# Patient Record
Sex: FEMALE | Race: OTHER | HISPANIC OR LATINO
[De-identification: names, ages, dates, MRNs, and addresses within clinical notes are randomized per-mention and may not be internally consistent; named-entity substitution may affect disease eponyms.]

---

## 2021-12-22 ENCOUNTER — NON-APPOINTMENT (OUTPATIENT)
Age: 41
End: 2021-12-22

## 2021-12-22 ENCOUNTER — APPOINTMENT (OUTPATIENT)
Dept: OBGYN | Facility: CLINIC | Age: 41
End: 2021-12-22
Payer: COMMERCIAL

## 2021-12-22 VITALS
SYSTOLIC BLOOD PRESSURE: 120 MMHG | WEIGHT: 237 LBS | DIASTOLIC BLOOD PRESSURE: 82 MMHG | HEIGHT: 65 IN | BODY MASS INDEX: 39.49 KG/M2

## 2021-12-22 DIAGNOSIS — N93.9 ABNORMAL UTERINE AND VAGINAL BLEEDING, UNSPECIFIED: ICD-10-CM

## 2021-12-22 DIAGNOSIS — Z72.3 LACK OF PHYSICAL EXERCISE: ICD-10-CM

## 2021-12-22 DIAGNOSIS — Z86.39 PERSONAL HISTORY OF OTHER ENDOCRINE, NUTRITIONAL AND METABOLIC DISEASE: ICD-10-CM

## 2021-12-22 DIAGNOSIS — Z86.018 PERSONAL HISTORY OF OTHER BENIGN NEOPLASM: ICD-10-CM

## 2021-12-22 DIAGNOSIS — D25.9 LEIOMYOMA OF UTERUS, UNSPECIFIED: ICD-10-CM

## 2021-12-22 PROBLEM — Z00.00 ENCOUNTER FOR PREVENTIVE HEALTH EXAMINATION: Status: ACTIVE | Noted: 2021-12-22

## 2021-12-22 PROCEDURE — 99203 OFFICE O/P NEW LOW 30 MIN: CPT

## 2021-12-22 PROCEDURE — 99072 ADDL SUPL MATRL&STAF TM PHE: CPT

## 2021-12-22 RX ORDER — NAPROXEN 500 MG/1
500 TABLET ORAL
Refills: 0 | Status: ACTIVE | COMMUNITY

## 2021-12-22 RX ORDER — MEDROXYPROGESTERONE ACETATE 5 MG/1
TABLET ORAL
Refills: 0 | Status: ACTIVE | COMMUNITY

## 2021-12-22 NOTE — PHYSICAL EXAM
[Chaperone Declined] : Patient declined chaperone [Appropriately responsive] : appropriately responsive [Alert] : alert [No Acute Distress] : no acute distress [Soft] : soft [Non-tender] : non-tender [Oriented x3] : oriented x3 [Labia Majora] : normal [Labia Minora] : normal [Moderate] : There was moderate vaginal bleeding [Normal] : normal [Uterine Adnexae] : normal [Tenderness] : nontender

## 2021-12-22 NOTE — HISTORY OF PRESENT ILLNESS
[FreeTextEntry1] : 40 y/o  presents for consult.\par \par Pt has a history of fibroids, iron deficiency and heavy periods.\par \par Pt has been under the care of Thelma Arboleda at [facility].  \par \par She has been continually bleeding for several months varying from light to moderate. \par \par Mirena IUD was initially placed in the summer, but patient eventually expelled it.\par \par She has been on OCPs and progesterone without any relief.\par \par She saw Dr. Arboleda yesterday and was told to discontinue OCPs and start progesterone daily.\par \par In November, patient was seen at [facility]. H&H was 8.2/26.9.\par \par An ultrasound was done:\par \par Uterus: 11.0 x 7.1 x 8.4 cm. Posterior fibroid measuring up to 5.3 cm. Left-sided uterine fibroid noted measuring up to 4.1 cm. Endometrium 6 mm: WNL\par RIght ovary: 2.7 x 1.9 x 2.8 cm.\par Left ovary: 2.1 x 1.8 x 2.0 cm.\par 6.3 left adnexal cyst.\par \par Pt does not desire any more children.\par \par \par

## 2021-12-22 NOTE — PLAN
[FreeTextEntry1] : -Options for management reviewed with patient including uterine artery embolization and hysterectomy.\par \par -Written information in [place] provided.\par \par -Pt desires consult to further explore [place], and this will be arranged.\par \par -I have spent 30 minutes on this encounter.\par

## 2022-01-11 ENCOUNTER — RESULT REVIEW (OUTPATIENT)
Age: 42
End: 2022-01-11

## 2022-02-22 ENCOUNTER — RESULT REVIEW (OUTPATIENT)
Age: 42
End: 2022-02-22

## 2022-02-24 ENCOUNTER — RESULT REVIEW (OUTPATIENT)
Age: 42
End: 2022-02-24

## 2022-02-25 ENCOUNTER — RESULT REVIEW (OUTPATIENT)
Age: 42
End: 2022-02-25

## 2022-03-11 ENCOUNTER — RESULT REVIEW (OUTPATIENT)
Age: 42
End: 2022-03-11

## 2022-07-01 ENCOUNTER — RESULT REVIEW (OUTPATIENT)
Age: 42
End: 2022-07-01

## 2023-03-15 ENCOUNTER — APPOINTMENT (OUTPATIENT)
Dept: HEMATOLOGY ONCOLOGY | Facility: CLINIC | Age: 43
End: 2023-03-15
Payer: COMMERCIAL

## 2023-03-15 VITALS
HEIGHT: 65 IN | SYSTOLIC BLOOD PRESSURE: 136 MMHG | OXYGEN SATURATION: 98 % | TEMPERATURE: 97.4 F | DIASTOLIC BLOOD PRESSURE: 77 MMHG | HEART RATE: 87 BPM | BODY MASS INDEX: 39.68 KG/M2 | RESPIRATION RATE: 16 BRPM | WEIGHT: 238.19 LBS

## 2023-03-15 DIAGNOSIS — Z78.9 OTHER SPECIFIED HEALTH STATUS: ICD-10-CM

## 2023-03-15 DIAGNOSIS — D50.0 IRON DEFICIENCY ANEMIA SECONDARY TO BLOOD LOSS (CHRONIC): ICD-10-CM

## 2023-03-15 PROCEDURE — 99204 OFFICE O/P NEW MOD 45 MIN: CPT

## 2023-03-15 NOTE — CONSULT LETTER
[Dear  ___] : Dear  [unfilled], [Consult Letter:] : I had the pleasure of evaluating your patient, [unfilled]. [Please see my note below.] : Please see my note below. [Consult Closing:] : Thank you very much for allowing me to participate in the care of this patient.  If you have any questions, please do not hesitate to contact me. [Sincerely,] : Sincerely, [FreeTextEntry3] : Jesus Giron MD, MPH\par  of Medicine [facility]\par Attending Physician \par Hematology and Medical Oncology\par [facility]\par

## 2023-03-15 NOTE — ASSESSMENT
[FreeTextEntry1] : Iron deficiency anemia\par LMP 3 weeks ago lasted for 3 days. Menses have been mild since endometrial embolization 1 year ago\par Was on oral iron until 3 months ago but made her severely constipatied\par Ferritin 4\par \par Discussed at length about iron deficiency- etiology, signs and symptoms, complications, management options\par DIscussed about oral vs IV Iron\par I have reviewed the risks, benefits and side effects of IV iron with the patient. All questions were answered to satisfaction. Patient agrees to pursue IV iron.\par Schedule IV venofer 200 mg  x 5\par Strongly recommend to see GI to rule out gi source of blood loss\par Repeat blood work including CBC, ferritin, iron studies in 5-6 weeks. \par Further IV iron PRN for ferritin < \par Information given in writing\par \par Patient had multiple questions which were answered to satisfaction\par \par Advised patient to monitor her labs with the care team including . Parameters including symptoms, signs and labs to monitor discussed at length and given in writing. Advised to follow up PRN, Contact information given. Patient agrees with complete understanding.\par \par \par

## 2023-03-15 NOTE — RESULTS/DATA
[FreeTextEntry1] : Labs  reviewed, analyzed and discussed\par \par 1/2023 Ferritin 4 H/H 12.4/40.1 MCV 82.3

## 2023-03-15 NOTE — HISTORY OF PRESENT ILLNESS
[de-identified] : Ms. Isabella Luong is 42 year old female with JEANNE here for consultation, referred by Dr. Suzanne Norman.\par [employer] # [id number]\par \par Patient with hx of uterine fibroids and menorrhagia who has been anemic since 2017.\par s/p embolization of fibroids in 2/2022 - now menses have been normal - 3 days every month - LMP - 3 weeks ago. \par \par 1/2023 Ferritin 4 H/H 12.4/40.1 MCV 82.3 \par \par Went into [facility] on 2/25/2022 with H/H 6.6/23.1 MCV 68.8 s/p Venofer 400 mg at the time.  \par s/p 2 units of PRBCS in Oct 2017 when her Hgb was 5.4\par \par Denies any family history of hematological and/or oncological disorders\par \par Social History\par Smoking - never \par Alcohol - none\par Illicit drugs - none\par Works as \par \par She has fatigue and SOB on exertion, which as worse in the last two months. \par Last iron supplement was 3 months ago

## 2023-03-17 ENCOUNTER — TRANSCRIPTION ENCOUNTER (OUTPATIENT)
Age: 43
End: 2023-03-17

## 2024-06-13 ENCOUNTER — APPOINTMENT (OUTPATIENT)
Dept: GASTROENTEROLOGY | Facility: CLINIC | Age: 44
End: 2024-06-13
Payer: COMMERCIAL

## 2024-06-13 VITALS
HEIGHT: 65 IN | BODY MASS INDEX: 39.65 KG/M2 | WEIGHT: 238 LBS | SYSTOLIC BLOOD PRESSURE: 140 MMHG | DIASTOLIC BLOOD PRESSURE: 88 MMHG

## 2024-06-13 DIAGNOSIS — Z12.11 ENCOUNTER FOR SCREENING FOR MALIGNANT NEOPLASM OF COLON: ICD-10-CM

## 2024-06-13 PROCEDURE — S0285 CNSLT BEFORE SCREEN COLONOSC: CPT

## 2024-06-13 NOTE — HISTORY OF PRESENT ILLNESS
[FreeTextEntry1] : Ms. NINA ESPOSITO is a 43-year-old female with a PMH of JEANNE (now resolved since UFE). PSH: Cholecystectomy (2018), R carpal tunnel repair (2009), lumbar back surgery (2009 and 2010), uterine fibroid embolization (2022).   Patient presents for pre-colonoscopy evaluation. Referred to practice by Dr. ASYA DURAN ([facility]) First colonoscopy.  Denies change in bowel habits, constipation, N/V/D, rectal bleeding, abdominal pain, bloating, unintentional weight loss, early satiety, dysphagia. Denies heartburn or reflux. Denies bloody or black stools. BM every 1-2 days; soft, brown stools that are easily passed.   Family history: Denies family history of colon cancer or advanced colorectal polyps. Denies family history of IBD or celiac disease.   Social: Smoking history: Never Alcohol consumption (# drinks/week): None Marijuana use: None Other recreational drug use: None

## 2024-06-13 NOTE — ASSESSMENT
[FreeTextEntry1] : CRC screening with Dr. Mckeon - Reviewed importance of adequate colon cleansing prior to colonoscopy. Instructed to contact office if he/she has any questions regarding prep.  - Explained the risks (including but not limited to cardiopulmonary anesthetic complications, bleeding, perforation, aspiration, missed lesions and misidentified sites of lesions - complications that might necessitate hospitalization or surgery), benefits and alternatives of colonoscopy were reviewed with the patient. Preparation for the procedure was reviewed with the patient. The patient was informed that she/he would be given intravenous anesthesia by an anesthesiologist for the procedure. The patient was informed that a family member or friend must drive the patient following recovery from the procedure. Patient understands and agrees, all questions answered. - Holds: None.

## 2024-09-16 ENCOUNTER — RESULT REVIEW (OUTPATIENT)
Age: 44
End: 2024-09-16

## 2024-09-17 ENCOUNTER — RESULT REVIEW (OUTPATIENT)
Age: 44
End: 2024-09-17

## 2024-09-17 ENCOUNTER — APPOINTMENT (OUTPATIENT)
Dept: GASTROENTEROLOGY | Facility: HOSPITAL | Age: 44
End: 2024-09-17

## 2025-07-29 ENCOUNTER — RESULT REVIEW (OUTPATIENT)
Age: 45
End: 2025-07-29

## 2025-09-11 ENCOUNTER — NON-APPOINTMENT (OUTPATIENT)
Age: 45
End: 2025-09-11